# Patient Record
Sex: FEMALE | Race: OTHER | Employment: STUDENT | URBAN - METROPOLITAN AREA
[De-identification: names, ages, dates, MRNs, and addresses within clinical notes are randomized per-mention and may not be internally consistent; named-entity substitution may affect disease eponyms.]

---
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
## 2024-11-23 ENCOUNTER — HOSPITAL ENCOUNTER (OUTPATIENT)
Age: 19
Discharge: HOME | End: 2024-11-23
Attending: EMERGENCY MEDICINE
Payer: COMMERCIAL

## 2024-11-23 VITALS
HEART RATE: 76 BPM | DIASTOLIC BLOOD PRESSURE: 69 MMHG | BODY MASS INDEX: 25.2 KG/M2 | RESPIRATION RATE: 18 BRPM | SYSTOLIC BLOOD PRESSURE: 112 MMHG | TEMPERATURE: 97 F | WEIGHT: 180 LBS | OXYGEN SATURATION: 100 % | HEIGHT: 71 IN

## 2024-11-23 DIAGNOSIS — L02.31 LEFT BUTTOCK ABSCESS: Primary | ICD-10-CM

## 2024-11-23 PROCEDURE — 99213 OFFICE O/P EST LOW 20 MIN: CPT

## 2024-11-23 PROCEDURE — 10060 I&D ABSCESS SIMPLE/SINGLE: CPT

## 2024-11-23 NOTE — ED PROVIDER NOTES
Patient Seen in: [facility]      History     Chief Complaint   Patient presents with    Abscess     Stated Complaint: bump/cyst    Subjective:   HPI      Patient comes to immediate care with left buttock pain.  She has noted some over the past several months, but it has become increasingly persistent and painful over the past few days.  She has had no fever or chills.  She has had no drainage.  The patient has no other history of cysts or abscess.    Objective:     Past Medical History:    Anxiety    Depression    Eating disorder              History reviewed. No pertinent surgical history.             Social History     Socioeconomic History    Marital status: Single   Tobacco Use    Smoking status: Never    Smokeless tobacco: Never   Vaping Use    Vaping status: Never Used   Substance and Sexual Activity    Alcohol use: Not Currently     Comment: last drank1 year ago    Drug use: Yes     Frequency: 3.0 times per week     Types: Cannabis     Comment: smokes a joint 3 x a week     Social Drivers of Health      Received from [facility]    Social Connections    Received from [facility]    Housing Stability              Review of Systems    Positive for stated complaint: bump/cyst  Other systems are as noted in HPI.  Constitutional and vital signs reviewed.      All other systems reviewed and negative except as noted above.    Physical Exam     ED Triage Vitals [11/23/24 0946]   /69   Pulse 76   Resp 18   Temp 97.1 °F (36.2 °C)   Temp src Temporal   SpO2 100 %   O2 Device None (Room air)       Current Vitals:   Vital Signs  BP: 112/69  Pulse: 76  Resp: 18  Temp: 97.1 °F (36.2 °C)  Temp src: Temporal    Oxygen Therapy  SpO2: 100 %  O2 Device: None (Room air)        Physical Exam  Vitals and nursing note reviewed.   Constitutional:       General: She is not in acute distress.     Appearance: Normal appearance. She is well-developed.   Skin:     Comments:  Patient is noted to have an area of tender fluctuance on the medial aspect of the patient's left buttock.  No surrounding erythema or cellulitis is noted.  The area of swelling is not within the pilonidal region.  The abscess does not extend into the immediate perianal region.   Neurological:      Mental Status: She is alert and oriented to person, place, and time.            ED Course   Labs Reviewed - No data to display         The abscess was anesthetized with lidocaine with epinephrine. A cruciate incision was performed with an 11 blade.  Pus was expressed and loculations were freed.  The abscess cavity was irrigated copiously.             MDM      Patient presents with left buttock pain and swelling localized.  The patient has fluctuance on exam, but no evidence of surrounding cellulitic appearance or clinical exam features suggestive of necrotizing fasciitis.  Wound was incised and drained with local anesthetic.  Wound care instructions were given to the patient.  Patient was instructed follow-up with primary care physician for wound recheck.        Medical Decision Making      Disposition and Plan     Clinical Impression:  1. Left buttock abscess         Disposition:  Discharge  11/23/2024 10:05 am    Follow-up:  Lino Browne MD  [address]  [address]  [facility] [address]  [phone]          Adela Méndez MD  [address]  [address]  [facility] [id number] [phone]                Medications Prescribed:  Current Discharge Medication List              Supplementary Documentation:

## 2024-11-23 NOTE — ED INITIAL ASSESSMENT (HPI)
Patient reports a cyst to her left buttocks that flares up and goes away.  Patient reports she has had issues since January but it hasn't been this painful.

## (undated) NOTE — MR AVS SNAPSHOT
[facility]  [address] [facility]  [place] [id number] [phone]               Thank you for choosing us for your health care visit with [facility]. We are glad to serve you and happy to provide you with this summary of your visit.   Please h · Cough  · Pain in the sinuses  · Thick, colored fluid from the nose (mucus)  · Fever  Diagnosing ABRS  ABRS may be diagnosed if you’ve had an upper respiratory infection like a cold and cough for longer than 10 to 14 days.  Your health care provider will a 70\" (100 %*, Z = 2.72) 170 lb (97 %*, Z = 1.92) 24.39 kg/m2 (90 %*, Z = 1.27)    Breastfeeding?           No      *Growth percentiles are based on CDC 2-20 Years data     BP percentiles are based on 2000 NHANES data         Current Medications          Th To lead a healthy active life, families can strive to reach these goals:  o 5 servings of fruits and vegetables a day  o 4 servings of water a day  o 3 servings of low-fat dairy a day  o 2 or less hours of screen time a day  o 1 or more hours of physical a

## (undated) NOTE — LETTER
Date: 9/3/2019    Patient Name: Franco Orozco          To Whom it may concern: This letter has been written at the patient's request. The above patient was seen at the [facility] for treatment of a medical condition.     This patient timothy

## (undated) NOTE — LETTER
Date & Time: 11/16/2022, 7:42 PM  Patient: Ivelisse Olguin  Encounter Provider(s):    LILLIAN Sapp       To Whom It May Concern:    Juan Jose Arevalo was seen and treated in our department on 11/16/2022. She should not return to work until 11/20/2022. If you have any questions or concerns, please do not hesitate to call.         Joshua SNYDER   Nurse Practitioner